# Patient Record
Sex: FEMALE
[De-identification: names, ages, dates, MRNs, and addresses within clinical notes are randomized per-mention and may not be internally consistent; named-entity substitution may affect disease eponyms.]

---

## 2019-09-05 PROBLEM — Z00.00 ENCOUNTER FOR PREVENTIVE HEALTH EXAMINATION: Status: ACTIVE | Noted: 2019-09-05

## 2019-09-09 ENCOUNTER — APPOINTMENT (OUTPATIENT)
Dept: VASCULAR SURGERY | Facility: CLINIC | Age: 49
End: 2019-09-09
Payer: COMMERCIAL

## 2019-09-09 PROCEDURE — 93970 EXTREMITY STUDY: CPT

## 2019-09-09 PROCEDURE — 99244 OFF/OP CNSLTJ NEW/EST MOD 40: CPT

## 2019-09-16 NOTE — HISTORY OF PRESENT ILLNESS
[FreeTextEntry1] : 50 y/o F with no PMHx, presents for an initial evaluation of b/l ankle edema. Pt has concerns for a longstanding b/l pitting edema that recently got worst. She states that it was on the L ankle, but it now occurs in the R as well. The pt expressed some distress as noted that the swelling has caused her to sprain her ankle about a month ago. The reports that she also visited her PCP and had a blood test which resulted in normal findings. She is not on any medication. The pt notes that she is not on menopause.

## 2019-09-16 NOTE — END OF VISIT
[FreeTextEntry3] : All medical record entries made by the Scribe were at my, Dr. Maryam Peterson, direction and personally dictated by me on 09/09/2019 I have reviewed the chart and agree that the record accurately reflects my personal performance of the history, physical exam, assessment and plan. I have also personally directed, reviewed and agreed with the chart.

## 2019-09-16 NOTE — ASSESSMENT
[FreeTextEntry1] : 50 y/o F with no PMHx, presents with b/l ankle edema. BLE venous US demonstrates no DVT/SVT, no deep reflux, no saphenous reflux.\par I informed the pt that her pitting edema is not caused by poor circulation. Pt is retaining water and it could be caused by an allergic reaction. I reassured the pt that the edema is not dangerous and suggested her to visit a rheumatologist, Dr. Merlin Sorensen.\par No vascular intervention needed. Patient will follow up here as needed.\par

## 2019-09-16 NOTE — ADDENDUM
[FreeTextEntry1] : I, Castro Otto, acted solely as a scribe for Dr. aMryam Peterson on this date. 09/09/2019

## 2019-09-16 NOTE — PHYSICAL EXAM
[2+] : right 2+ [Ankle Swelling (On Exam)] : present [Ankle Swelling Bilaterally] : bilaterally  [Alert] : alert [Calm] : calm [Normal Breath Sounds] : Normal breath sounds [JVD] : no jugular venous distention  [de-identified] : Well appearing, NAD [de-identified] : NCAT [FreeTextEntry1] : BLE Pitting edema L>R [de-identified] : FROM

## 2022-06-17 NOTE — PROCEDURE
Conjuntivae and eyelids appear normal,  Sclerae : White without injection [FreeTextEntry1] : BLE venous US demonstrates no DVT/SVT, no deep reflux, no saphenous reflux

## 2024-01-05 ENCOUNTER — APPOINTMENT (OUTPATIENT)
Dept: SURGERY | Facility: CLINIC | Age: 54
End: 2024-01-05